# Patient Record
Sex: MALE | Race: AMERICAN INDIAN OR ALASKA NATIVE
[De-identification: names, ages, dates, MRNs, and addresses within clinical notes are randomized per-mention and may not be internally consistent; named-entity substitution may affect disease eponyms.]

---

## 2019-12-04 ENCOUNTER — HOSPITAL ENCOUNTER (EMERGENCY)
Dept: HOSPITAL 5 - ED | Age: 34
LOS: 1 days | Discharge: LEFT BEFORE DISCHARGE | End: 2019-12-05
Payer: SELF-PAY

## 2019-12-04 VITALS — DIASTOLIC BLOOD PRESSURE: 90 MMHG | SYSTOLIC BLOOD PRESSURE: 171 MMHG

## 2019-12-04 DIAGNOSIS — J02.9: Primary | ICD-10-CM

## 2019-12-04 DIAGNOSIS — Z53.21: ICD-10-CM

## 2019-12-23 ENCOUNTER — HOSPITAL ENCOUNTER (EMERGENCY)
Dept: HOSPITAL 5 - ED | Age: 34
LOS: 1 days | Discharge: HOME | End: 2019-12-24
Payer: COMMERCIAL

## 2019-12-23 DIAGNOSIS — I10: ICD-10-CM

## 2019-12-23 DIAGNOSIS — R20.2: Primary | ICD-10-CM

## 2019-12-23 DIAGNOSIS — Z88.6: ICD-10-CM

## 2019-12-23 LAB
ALBUMIN SERPL-MCNC: 4.1 G/DL (ref 3.9–5)
ALT SERPL-CCNC: 15 UNITS/L (ref 7–56)
BASOPHILS # (AUTO): 0.1 K/MM3 (ref 0–0.1)
BASOPHILS NFR BLD AUTO: 1 % (ref 0–1.8)
BUN SERPL-MCNC: 11 MG/DL (ref 9–20)
BUN/CREAT SERPL: 10 %
CALCIUM SERPL-MCNC: 9 MG/DL (ref 8.4–10.2)
EOSINOPHIL # BLD AUTO: 0.2 K/MM3 (ref 0–0.4)
EOSINOPHIL NFR BLD AUTO: 2 % (ref 0–4.3)
HCT VFR BLD CALC: 43.4 % (ref 35.5–45.6)
HEMOLYSIS INDEX: 10
HGB BLD-MCNC: 14 GM/DL (ref 11.8–15.2)
LYMPHOCYTES # BLD AUTO: 2.9 K/MM3 (ref 1.2–5.4)
LYMPHOCYTES NFR BLD AUTO: 34.1 % (ref 13.4–35)
MCHC RBC AUTO-ENTMCNC: 32 % (ref 32–34)
MCV RBC AUTO: 76 FL (ref 84–94)
MONOCYTES # (AUTO): 0.8 K/MM3 (ref 0–0.8)
MONOCYTES % (AUTO): 9.6 % (ref 0–7.3)
PLATELET # BLD: 216 K/MM3 (ref 140–440)
RBC # BLD AUTO: 5.72 M/MM3 (ref 3.65–5.03)

## 2019-12-23 PROCEDURE — 80053 COMPREHEN METABOLIC PANEL: CPT

## 2019-12-23 PROCEDURE — 93010 ELECTROCARDIOGRAM REPORT: CPT

## 2019-12-23 PROCEDURE — 36415 COLL VENOUS BLD VENIPUNCTURE: CPT

## 2019-12-23 PROCEDURE — 93005 ELECTROCARDIOGRAM TRACING: CPT

## 2019-12-23 PROCEDURE — 85025 COMPLETE CBC W/AUTO DIFF WBC: CPT

## 2019-12-23 NOTE — EVENT NOTE
ED Screening Note


Date of service: 12/23/19


Time: 18:00


ED Screening Note: 





Pt complains of right lower arm and hand numbness/tingling since 2pm


HTN-states compliance with meds


+tingling in right foot


Negative rhomberg; +decreased senasation to light touch on right 


Denies hx of MI/CVA





This initial assessment/diagnostic orders/clinical plan/treatment(s) is/are 

subject to change based on patients health status, clinical progression and re-

assessment by fellow clinical providers in the ED. Further treatment and workup 

at subsequent clinical providers discretion. Patient/guardian urged not to elope

from the ED as their condition may be serious if not clinically assessed and 

managed. 





Initial orders include: 


CT


labs

## 2019-12-24 VITALS — DIASTOLIC BLOOD PRESSURE: 108 MMHG | SYSTOLIC BLOOD PRESSURE: 148 MMHG

## 2019-12-24 NOTE — EMERGENCY DEPARTMENT REPORT
ED General Adult HPI





- General


Chief complaint: Neuro Symptoms/Deficit


Stated complaint: L ARM PAIN


Time Seen by Provider: 12/23/19 18:00


Source: patient


Mode of arrival: Ambulatory


Limitations: No Limitations





- History of Present Illness


Initial comments: 





Reports some tingling in his right hand today.  Reports he is on gabapentin for 

nerve pain and that it is not working as well and requesting some stronger to 

treat his nerve pain.  Requesting rx of anxiety also.  Reports he is from [facility] 

where he was being followed for TIA and nerve pain.  Reports he had tingling 

today in his right hand and wanted to come in for evaluation to make sure he was

not having another TIA.  





- Related Data


                                    Allergies











Allergy/AdvReac Type Severity Reaction Status Date / Time


 


lisinopril Allergy  Unknown Verified 12/04/19 23:10














ED Review of Systems


ROS: 


Stated complaint: L ARM PAIN


Other details as noted in HPI





Other: 





GENERAL: No weight change, fatigue, fever, chills, or night sweats


SKIN: No changes in skin or hair, no itching, no rashes, no jaundice


HEAD: No trauma 


EYES: No blurriness, tearing, itching, acute visual loss, conjunctival 

discoloration, or scleral icterus


EARS: No hearing loss,  tinnitus, vertigo, or earache


NOSE: No rhinorrhea, stuffiness, sneezing, itching, or epistaxis


MOUTH: No bleeding gums, hoarseness, sore throat, or swelling


CARDIAC: No new murmur, chest pain, palpitations, dyspnea on exertion, 

orthopnea, PND, or edema


RESPIRATORY: No shortness of breath, wheeze, cough, sputum production, 

hemoptysis


GI: No abdominal pain, nausea, vomiting,  dysphagia, diarrhea, constipation, 

hematemesis, melena, hematochezia


URINARY: No frequency, urgency, polyuria, dysuria, hematuria, or incontinence


MUSCULOSKELETAL: No muscle weakness, joint stiffness, decrease in range of 

motion, redness, swelling


NEUROLOGIC: Tingling in right hand.  No headache, syncope, loss of sensation, 

numbness, tremors, weakness, paralysis, seizures


HEMATOLOGIC: No anemia, easy bruising, bleeding, petechiae, or purpura


ENDOCRINE: No hot or cold intolerance, sweating, polyuria, polydipsia or, 

polyphagia no thyroid problems


PSYCHIATRIC: No change in mood, no anxiety, no depression





ED Past Medical Hx





- Past Medical History


Hx Hypertension: Yes


Additional medical history: tia in september,





- Surgical History


Past Surgical History?: No





- Social History


Smoking Status: Former Smoker


Substance Use Type: None





ED Physical Exam





- General


Limitations: No Limitations





- Other


Other exam information: 








GENERAL: Patient in no acute distress


HEAD: Normocephalic, atraumatic


EYES: PERRLA, EOM intact, no scleral icterus, no conjunctival hemorrhage, visual

fields and acuity wnl


NOSE: No tenderness, discharge, sinus tenderness


MOUTH: No erythema, bleeding, exudate


HEART: Regular rate and rhythm, no murmur, S1-S2 are auscultated, no edema, 

pulses are symmetric


LUNGS: No respiratory distress. Bilateral breath sounds, No tachypnea, No 

retractions, No wheezing, rales, rhonchi


ABDOMEN: Normal bowel sounds, abdomen soft, no tenderness, no rebound, no 

guarding, no distention, no masses, no CVA tenderness


MUSCULOSKELETAL: Normal joint range of motion, no redness, no swelling, no 

tenderness


NEUROLOGIC: GCS 15, Alert and Oriented x3, Cranial nerves intact, normal 

sensation, normal strength, no cerebellar deficit, NIHSS 0


PSYCHIATRIC: No homicidal or suicidal ideation, no anxiety, no depression, no 

hallucinations


SKIN: Skin is warm and dry, no wounds, no rashes








NEUROLOGIC: normal gait





ED Course





                                   Vital Signs











  12/23/19 12/24/19





  18:00 00:25


 


Temperature 99.4 F 98.7 F


 


Pulse Rate 88 77


 


Respiratory 18 18





Rate  


 


Blood Pressure 169/101 


 


Blood Pressure  161/105





[Left]  


 


O2 Sat by Pulse 98 100





Oximetry  














ED Medical Decision Making





- Lab Data


Result diagrams: 


                                 12/23/19 19:20





                                 12/23/19 19:20








                         Laboratory Results - last 24 hr











  12/23/19 12/23/19





  19:20 19:20


 


WBC  8.7 


 


RBC  5.72 H 


 


Hgb  14.0 


 


Hct  43.4 


 


MCV  76 L 


 


MCH  24 L 


 


MCHC  32 


 


RDW  16.6 H 


 


Plt Count  216 


 


Lymph % (Auto)  34.1 


 


Mono % (Auto)  9.6 H 


 


Eos % (Auto)  2.0 


 


Baso % (Auto)  1.0 


 


Lymph #  2.9 


 


Mono #  0.8 


 


Eos #  0.2 


 


Baso #  0.1 


 


Seg Neutrophils %  53.3 


 


Seg Neutrophils #  4.6 


 


Sodium   142


 


Potassium   3.5 L


 


Chloride   106.9


 


Carbon Dioxide   21 L


 


Anion Gap   18


 


BUN   11


 


Creatinine   1.1


 


Estimated GFR   > 60


 


BUN/Creatinine Ratio   10


 


Glucose   125 H


 


Calcium   9.0


 


Total Bilirubin   0.40


 


AST   17


 


ALT   15


 


Alkaline Phosphatase   63


 


Total Protein   6.9


 


Albumin   4.1


 


Albumin/Globulin Ratio   1.5














- EKG Data


When compared to previous EKG there are: no significant change





- Medical Decision Making





Patient comfortable.  Reports symptom improvement.  Plan discharge with 

outpatient follow up.  Return if any worsening. 





Critical care attestation.: 


If time is entered above; I have spent that time in minutes in the direct care 

of this critically ill patient, excluding procedure time.








ED Disposition


Clinical Impression: 


 Arm paresthesia, right





Disposition: DC-01 TO HOME OR SELFCARE


Is pt being admited?: No


Condition: Stable


Instructions:  Paresthesia (ED)


Referrals: 


KIKA WINCHESTER MD [Staff Physician] - 2-3 Days


BROOKLYN HAMILTON MD [Staff Physician] - 2-3 Days


Time of Disposition: 01:43